# Patient Record
Sex: FEMALE | Race: OTHER | HISPANIC OR LATINO
[De-identification: names, ages, dates, MRNs, and addresses within clinical notes are randomized per-mention and may not be internally consistent; named-entity substitution may affect disease eponyms.]

---

## 2022-03-29 PROBLEM — Z00.00 ENCOUNTER FOR PREVENTIVE HEALTH EXAMINATION: Status: ACTIVE | Noted: 2022-03-29

## 2022-03-31 ENCOUNTER — APPOINTMENT (OUTPATIENT)
Dept: SURGERY | Facility: CLINIC | Age: 32
End: 2022-03-31
Payer: COMMERCIAL

## 2022-03-31 VITALS
DIASTOLIC BLOOD PRESSURE: 71 MMHG | WEIGHT: 187 LBS | HEIGHT: 62 IN | SYSTOLIC BLOOD PRESSURE: 113 MMHG | OXYGEN SATURATION: 99 % | HEART RATE: 93 BPM | BODY MASS INDEX: 34.41 KG/M2

## 2022-03-31 VITALS — TEMPERATURE: 97 F

## 2022-03-31 DIAGNOSIS — Z78.9 OTHER SPECIFIED HEALTH STATUS: ICD-10-CM

## 2022-03-31 DIAGNOSIS — E78.00 PURE HYPERCHOLESTEROLEMIA, UNSPECIFIED: ICD-10-CM

## 2022-03-31 DIAGNOSIS — Z56.0 UNEMPLOYMENT, UNSPECIFIED: ICD-10-CM

## 2022-03-31 PROCEDURE — 99203 OFFICE O/P NEW LOW 30 MIN: CPT

## 2022-03-31 SDOH — ECONOMIC STABILITY - INCOME SECURITY: UNEMPLOYMENT, UNSPECIFIED: Z56.0

## 2022-03-31 NOTE — PLAN
[FreeTextEntry1] : Ms. TAMIR OCASIO  was told significance of findings, options, risks and benefits were explained.  Informed consent for    ventral hernia repair and potential risks, benefits and alternatives (surgical options were discussed including non-surgical options or the option of no surgery) to the planned surgery were discussed in depth.  All surgical options were discussed including non-surgical treatments.  She wishes to proceed with surgery.  We will plan for surgery on at the next available date, pending any required insurance pre-certification or pre-approval. She agrees to obtain any necessary pre-operative evaluations and testing prior to surgery.\par Patient advised to seek immediate medical attention with any acute change in symptoms or with the development of any new or worsening symptoms.  Patient's questions and concerns addressed to patient's satisfaction, and patient verbalized an understanding of the information discussed.\par \par

## 2022-03-31 NOTE — PHYSICAL EXAM
[Alert] : alert [Oriented to Person] : oriented to person [Oriented to Place] : oriented to place [Oriented to Time] : oriented to time [Calm] : calm [Abdominal Masses] : No abdominal masses [Abdomen Tenderness] : ~T ~M No abdominal tenderness [de-identified] : She  is alert, well-groomed, and in NAD\par   [de-identified] : anicteric.  Nasal mucosa pink, septum midline. Oral mucosa pink.  Tongue midline, Pharynx without exudates.\par   [de-identified] : Neck supple. Trachea midline. Thyroid isthmus barely palpable, lobes not felt.\par   [de-identified] : \par reducible  ventral hernia, mildly tender.  The defect appears to be relatively small  and the skin overlying the hernia is normal

## 2022-03-31 NOTE — HISTORY OF PRESENT ILLNESS
[de-identified] : Ms. DELPHINE OCASIO is a 31 year  old female who was referred by Dr. Andrew HESTER with the chief complaint of having a hernia.  She reports having this condition for 3 months. She denies any trauma to the area, fever, nausea, vomiting, distension, night sweats and  loss of appetite.  Symptoms aggravated by cough and straining.  - She reports normal bowel movements    -She  reports  open  cholecystectomy  and umbilical repair last year in [facility].

## 2022-06-03 ENCOUNTER — OUTPATIENT (OUTPATIENT)
Dept: OUTPATIENT SERVICES | Facility: HOSPITAL | Age: 32
LOS: 1 days | End: 2022-06-03
Payer: COMMERCIAL

## 2022-06-03 VITALS
HEIGHT: 59.84 IN | HEART RATE: 65 BPM | RESPIRATION RATE: 16 BRPM | TEMPERATURE: 99 F | WEIGHT: 182.98 LBS | OXYGEN SATURATION: 100 % | DIASTOLIC BLOOD PRESSURE: 72 MMHG | SYSTOLIC BLOOD PRESSURE: 114 MMHG

## 2022-06-03 DIAGNOSIS — K43.2 INCISIONAL HERNIA WITHOUT OBSTRUCTION OR GANGRENE: ICD-10-CM

## 2022-06-03 DIAGNOSIS — Z98.891 HISTORY OF UTERINE SCAR FROM PREVIOUS SURGERY: Chronic | ICD-10-CM

## 2022-06-03 DIAGNOSIS — Z90.49 ACQUIRED ABSENCE OF OTHER SPECIFIED PARTS OF DIGESTIVE TRACT: Chronic | ICD-10-CM

## 2022-06-03 DIAGNOSIS — K21.9 GASTRO-ESOPHAGEAL REFLUX DISEASE WITHOUT ESOPHAGITIS: ICD-10-CM

## 2022-06-03 DIAGNOSIS — Z01.818 ENCOUNTER FOR OTHER PREPROCEDURAL EXAMINATION: ICD-10-CM

## 2022-06-03 DIAGNOSIS — E66.9 OBESITY, UNSPECIFIED: ICD-10-CM

## 2022-06-03 DIAGNOSIS — Z98.890 OTHER SPECIFIED POSTPROCEDURAL STATES: Chronic | ICD-10-CM

## 2022-06-03 DIAGNOSIS — Z78.9 OTHER SPECIFIED HEALTH STATUS: ICD-10-CM

## 2022-06-03 DIAGNOSIS — K43.9 VENTRAL HERNIA WITHOUT OBSTRUCTION OR GANGRENE: ICD-10-CM

## 2022-06-03 PROCEDURE — G0463: CPT

## 2022-06-03 NOTE — H&P PST ADULT - NSICDXPASTSURGICALHX_GEN_ALL_CORE_FT
PAST SURGICAL HISTORY:  H/O  section     H/O umbilical hernia repair     History of cholecystectomy

## 2022-06-03 NOTE — H&P PST ADULT - NSICDXFAMILYHX_GEN_ALL_CORE_FT
FAMILY HISTORY:  Mother  Still living? Yes, Estimated age: 52  Family history of diabetes mellitus (DM), Age at diagnosis: Age Unknown

## 2022-06-03 NOTE — H&P PST ADULT - PROBLEM SELECTOR PROBLEM 3
Phone Number Called: [phone] (home)       Call outcome: Spoke to patient regarding message below.    Message: Called to inform patient labs look good        
Ventral incisional hernia without obstruction or gangrene

## 2022-06-03 NOTE — H&P PST ADULT - HISTORY OF PRESENT ILLNESS
32 yr old  female obese with history of GERD, had ventral hernia for about 4 months ago when it started to hurt her. Pt was referred to surgeon. Pt schedule for ventral hernia repair on 6/8/2022. All history obtained via  #[id number] Susanne

## 2022-06-03 NOTE — H&P PST ADULT - PROBLEM SELECTOR PLAN 3
schedule for ventral hernia repair Pt given instructions with assistance of the . Pt aware of NPO the night before surgery and the morning of surgery. Pt also told how the use chlorhexidine 4% solution for the three days including the day of surgery. Pt given written instructions to follow schedule for ventral hernia repair Pt given instructions with assistance of the . Pt aware of NPO the night before surgery and the morning of surgery. Pt also told how the use chlorhexidine 4% solution for the three days including the day of surgery. Pt given written instructions to follow      Stop Bang Score -0 pt low risk for EMMA

## 2022-06-03 NOTE — H&P PST ADULT - NSICDXPASTMEDICALHX_GEN_ALL_CORE_FT
PAST MEDICAL HISTORY:  GERD (gastroesophageal reflux disease)     Obese     Ventral incisional hernia without obstruction or gangrene

## 2022-06-03 NOTE — H&P PST ADULT - ASSESSMENT
32 yr old  female obese with history of GERD, had ventral hernia for about 4 months ago when it started to hurt her. Pt was referred to surgeon. Pt schedule for ventral hernia repair on 6/8/2022. All history obtained via  #[id number] Susanne

## 2022-06-07 ENCOUNTER — TRANSCRIPTION ENCOUNTER (OUTPATIENT)
Age: 32
End: 2022-06-07

## 2022-06-08 ENCOUNTER — RESULT REVIEW (OUTPATIENT)
Age: 32
End: 2022-06-08

## 2022-06-08 ENCOUNTER — APPOINTMENT (OUTPATIENT)
Dept: SURGERY | Facility: HOSPITAL | Age: 32
End: 2022-06-08
Payer: COMMERCIAL

## 2022-06-08 ENCOUNTER — OUTPATIENT (OUTPATIENT)
Dept: OUTPATIENT SERVICES | Facility: HOSPITAL | Age: 32
LOS: 1 days | End: 2022-06-08
Payer: COMMERCIAL

## 2022-06-08 ENCOUNTER — TRANSCRIPTION ENCOUNTER (OUTPATIENT)
Age: 32
End: 2022-06-08

## 2022-06-08 VITALS
SYSTOLIC BLOOD PRESSURE: 116 MMHG | HEART RATE: 79 BPM | TEMPERATURE: 98 F | RESPIRATION RATE: 18 BRPM | OXYGEN SATURATION: 97 % | DIASTOLIC BLOOD PRESSURE: 72 MMHG

## 2022-06-08 VITALS
SYSTOLIC BLOOD PRESSURE: 107 MMHG | HEIGHT: 59.84 IN | RESPIRATION RATE: 17 BRPM | OXYGEN SATURATION: 98 % | WEIGHT: 182.98 LBS | DIASTOLIC BLOOD PRESSURE: 52 MMHG | TEMPERATURE: 99 F | HEART RATE: 74 BPM

## 2022-06-08 DIAGNOSIS — Z98.890 OTHER SPECIFIED POSTPROCEDURAL STATES: Chronic | ICD-10-CM

## 2022-06-08 DIAGNOSIS — Z90.49 ACQUIRED ABSENCE OF OTHER SPECIFIED PARTS OF DIGESTIVE TRACT: Chronic | ICD-10-CM

## 2022-06-08 DIAGNOSIS — K43.9 VENTRAL HERNIA WITHOUT OBSTRUCTION OR GANGRENE: ICD-10-CM

## 2022-06-08 DIAGNOSIS — Z98.891 HISTORY OF UTERINE SCAR FROM PREVIOUS SURGERY: Chronic | ICD-10-CM

## 2022-06-08 LAB — HCG SERPL-ACNC: <1 MIU/ML — SIGNIFICANT CHANGE UP

## 2022-06-08 PROCEDURE — 49568: CPT

## 2022-06-08 PROCEDURE — 36415 COLL VENOUS BLD VENIPUNCTURE: CPT

## 2022-06-08 PROCEDURE — 49560: CPT | Mod: AS

## 2022-06-08 PROCEDURE — C1781: CPT

## 2022-06-08 PROCEDURE — 88302 TISSUE EXAM BY PATHOLOGIST: CPT | Mod: 26

## 2022-06-08 PROCEDURE — 49560: CPT

## 2022-06-08 PROCEDURE — 88302 TISSUE EXAM BY PATHOLOGIST: CPT

## 2022-06-08 PROCEDURE — 84702 CHORIONIC GONADOTROPIN TEST: CPT

## 2022-06-08 DEVICE — MESH HERNIA VENTRALIGHT ST CIRCLE 4.5IN: Type: IMPLANTABLE DEVICE | Status: FUNCTIONAL

## 2022-06-08 DEVICE — MESH HERNIA PROLENE 3X6IN: Type: IMPLANTABLE DEVICE | Status: FUNCTIONAL

## 2022-06-08 RX ORDER — SODIUM CHLORIDE 9 MG/ML
3 INJECTION INTRAMUSCULAR; INTRAVENOUS; SUBCUTANEOUS EVERY 8 HOURS
Refills: 0 | Status: DISCONTINUED | OUTPATIENT
Start: 2022-06-08 | End: 2022-06-08

## 2022-06-08 RX ORDER — OXYCODONE HYDROCHLORIDE 5 MG/1
1 TABLET ORAL
Qty: 5 | Refills: 0
Start: 2022-06-08

## 2022-06-08 RX ORDER — HYDROMORPHONE HYDROCHLORIDE 2 MG/ML
0.5 INJECTION INTRAMUSCULAR; INTRAVENOUS; SUBCUTANEOUS
Refills: 0 | Status: DISCONTINUED | OUTPATIENT
Start: 2022-06-08 | End: 2022-06-08

## 2022-06-08 RX ORDER — FENTANYL CITRATE 50 UG/ML
25 INJECTION INTRAVENOUS
Refills: 0 | Status: DISCONTINUED | OUTPATIENT
Start: 2022-06-08 | End: 2022-06-08

## 2022-06-08 RX ORDER — FERROUS SULFATE 325(65) MG
1 TABLET ORAL
Qty: 0 | Refills: 0 | DISCHARGE

## 2022-06-08 RX ORDER — OXYCODONE AND ACETAMINOPHEN 5; 325 MG/1; MG/1
1 TABLET ORAL ONCE
Refills: 0 | Status: DISCONTINUED | OUTPATIENT
Start: 2022-06-08 | End: 2022-06-08

## 2022-06-08 RX ORDER — ACETAMINOPHEN 500 MG
1000 TABLET ORAL ONCE
Refills: 0 | Status: DISCONTINUED | OUTPATIENT
Start: 2022-06-08 | End: 2022-06-08

## 2022-06-08 RX ORDER — ONDANSETRON 8 MG/1
4 TABLET, FILM COATED ORAL ONCE
Refills: 0 | Status: DISCONTINUED | OUTPATIENT
Start: 2022-06-08 | End: 2022-06-08

## 2022-06-08 RX ORDER — OMEPRAZOLE 10 MG/1
1 CAPSULE, DELAYED RELEASE ORAL
Qty: 0 | Refills: 0 | DISCHARGE

## 2022-06-08 RX ADMIN — HYDROMORPHONE HYDROCHLORIDE 0.5 MILLIGRAM(S): 2 INJECTION INTRAMUSCULAR; INTRAVENOUS; SUBCUTANEOUS at 10:50

## 2022-06-08 RX ADMIN — HYDROMORPHONE HYDROCHLORIDE 0.5 MILLIGRAM(S): 2 INJECTION INTRAMUSCULAR; INTRAVENOUS; SUBCUTANEOUS at 11:05

## 2022-06-08 NOTE — ASU DISCHARGE PLAN (ADULT/PEDIATRIC) - NS MD DC FALL RISK RISK
For information on Fall & Injury Prevention, visit: [email or web address] OR  [email or web address] OR  https://www.cdc.gov/steadi/patient.html

## 2022-06-08 NOTE — BRIEF OPERATIVE NOTE - NSICDXBRIEFPROCEDURE_GEN_ALL_CORE_FT
PROCEDURES:  Ventral hernia repair 08-Jun-2022 10:25:12 With Mesh Adelaide Crowder  Incisional hernia repair 08-Jun-2022 10:29:40  Adelaide Crowder

## 2022-06-08 NOTE — ASU DISCHARGE PLAN (ADULT/PEDIATRIC) - DRESSING DRY FT
INFORMATION YOUR PROVIDER WANTS YOU TO KNOW    Thank you for choosing Dr. Xavier Ledezma at [facility]. It was a pleasure to care for you today!    Clinic Policy:  Cancellation and No Show: If you must cancel a visit, please give minimally 24 hours notice so we can accommodate other patients that have been waiting to be seen. Do not rely on a reminder call for your appointment as it is a courtesy and not guaranteed. Please write it on your calendar. You are responsible to be at all scheduled appointments. Any appointment cancelled less than 24 hours prior to start time will be considered a “No Show/Late Cancellation”.      You can cancel your appointment by calling our office at [phone] during our normal business hours which are as follows:  Monday-Thursday 8:00 am to 4:30 pm  Friday 8:00 am to 2:00 pm     Messages:  Messages left for Dr. Xavier Ledezma will be returned within 24-48 business hours.     Medication Requests:  We need up to 7 business days notice for refills to be completed. Please contact your preferred pharmacy for all non-narcotic refills. The pharmacy will contact the office for those refills. It is imperative that you plan ahead as we are unable to guarantee your refill by a certain date if this is not followed. If you miss appointments, you may not get a refill. When calling for refills or other concerns, please take into consideration that we are closed for holidays.    No medication changes are made over the phone, if you feel that a medication change is needed, please make an appointment.    Test results:  Any tests ordered by Dr. Ledezma will be reviewed at your next appointment.    Forms (FMLA/ Disability):  FMLA/disability forms will need an appointment to be completed. Please complete as much as possible on any forms prior to bringing them to your appointment. This includes adding a telephone number where you can be reached during normal  business hours. Please note that if you are requesting disability paperwork, you will have to complete a Functional Capacity Evaluation. This involves an evaluation that lasts approximately 4 hours and you will be responsible to call your insurance company to verify it is a covered expense.    To improve your clinical care, we practice treatments that often include Urine Drug Testing, a tool that helps to best monitor your care.    Reports are always confidential.    Urine Drug Testing (UDT) can be a valuable tool in clinical practices that include prescription of opioid analgesics for treatment of chronic pain. The overriding principle is that this testing is used to help the patient, and to enhance positive outcomes. The issue of testing is complex and the purpose of this guidance document is to assist the practitioner in formulating the rationale for testing, as well as to provide a framework for using test results in a positive way for the patient.    For billing questions, please contact:    [phone]     8 a.m-8 p.m Eastern Time     Monday-Friday    For more information, visit [email or web address]    We know how important your pain management is and together we can help you live your best life.    Your opinion matters!  Our desire is to increase your overall state of wellness and improve your quality of life with individualized patient care and innovative interventional modalities.    In the next few weeks, you may receive a [employer] survey regarding your clinic visit with us today. Your responses on this survey help us to maintain and improve the care we provide to you! We look forward to hearing from you!         Lumbar Epidural Injection: Recovery at Home    After a lumbar epidural injection, you don’t need to stay in bed when you get home. In fact, it’s best to walk around if you feel up to it. Just be careful about being too active. Even if you feel better right away, avoid activities that may  strain your back. And follow up on all treatment with your doctor.  What to know about pain relief  Keep in mind that some people feel more pain at first. It usually goes away within a few days. You may also have headaches or trouble sleeping, but if these symptoms are severe, you should call your doctor right away. These should also go away within a few days. In general:  · An injection to reduce inflammation takes a few days to work, sometimes even up to a week. There may even be more pain at first.  · An injection to help locate the source of pain may give only brief pain relief. Later, you’ll feel the same as you did before the injection.  Tips for recovery  Whether you were injected for pain relief or diagnosis, these tips will help you recover:  · Take walks when you feel up to it.  · Rest if needed, but get up and move around after sitting for half an hour.  · Don’t exercise vigorously.  · Don’t drive the day of the procedure or until your doctor says it’s OK.  · Return to work or other activities when your doctor says you’re ready.  When to call your doctor  Call right away if you notice any of the following symptoms:  · Severe pain or headache  · Loss of bladder or bowel control  · Fever or chills  · Redness or swelling around the injection site       © 0649-3689 [employer]. [address] [place] [address], [place] [address]. All rights reserved. This information is not intended as a substitute for professional medical care. Always follow your healthcare professional's instructions.         2

## 2022-06-08 NOTE — ASU DISCHARGE PLAN (ADULT/PEDIATRIC) - ASU DC SPECIAL INSTRUCTIONSFT
Please follow-up with your surgeon in 1 week. Drink plenty of fluids and rest as needed. Call for any fever over 101, nausea, vomiting, severe pain, no passing of gas or bowel movement.     DIET   You may resume your regular diet as normal.     SURGICAL SITES   Remove outer dressing and keep white steri-strips in place allowing them to fall off on their own. You may shower 48 hours post-operatively but do not bathe or soak in the water for 1-2 weeks; pat dry. If you notice any signs of surgical site infection (ie. redness, swelling, pain, pus drainage), please seek medical care immediately.    ACTIVITY Do not lift anything heavier than 10 pounds for 2-3 months, no exercise for 2 weeks) and avoid strenuous activity for 4-6 weeks.     PAIN CONTROL   You may take Motrin 600mg (with food) or Tylenol 650mg as needed for mild pain. Stagger one medication 3 hours after the other for maximum pain control. Maximum daily dose of Tylenol should not exceed 4grams/day.  You may take your prescribed oxycodone for severe breakthrough pain not that is not relieved by Tylenol/Motrin. Do not drive or make important decisions while taking this medication and do not take more than 4 pills in 24 hours.  Please refer to medical records/ progress notes for in depth hospital course. Discharge plan discussed and agreed with attending.

## 2022-06-08 NOTE — ASU DISCHARGE PLAN (ADULT/PEDIATRIC) - CARE PROVIDER_API CALL
Bill Ellington)  Surgery  [address], [place] [address]  Phone: [phone]  Fax: [phone]  Follow Up Time: 2 weeks

## 2022-06-08 NOTE — BRIEF OPERATIVE NOTE - NSICDXBRIEFPOSTOP_GEN_ALL_CORE_FT
POST-OP DIAGNOSIS:  Ventral hernia 08-Jun-2022 10:30:14  Adelaide Crowder  Incisional hernia 08-Jun-2022 10:30:24  Adelaide Crowder

## 2022-06-08 NOTE — ASU DISCHARGE PLAN (ADULT/PEDIATRIC) - NS DC  LANGUAGE ASSIST NO REASON
EMERGENCY DEPARTMENT HISTORY AND PHYSICAL EXAM    8:15 PM      Date: 6/29/2020  Patient Name: Yuli Butler    Ysabel Love  mL/hr at 06/29/20 1955 500 mL at 06/29/20 1955    magnesium sulfate 2 g/50 ml IVPB (premix or compounded)  2 g IntraVENous ONCE Ysabel Love MD 50 mL/hr at 06/29/20 1955 2 g at 06/29/20 1955       Patient/caregiver requests family/friend to interpret.

## 2022-06-09 PROBLEM — K21.9 GASTRO-ESOPHAGEAL REFLUX DISEASE WITHOUT ESOPHAGITIS: Chronic | Status: ACTIVE | Noted: 2022-06-03

## 2022-06-09 PROBLEM — E66.9 OBESITY, UNSPECIFIED: Chronic | Status: ACTIVE | Noted: 2022-06-03

## 2022-06-09 PROBLEM — K43.2 INCISIONAL HERNIA WITHOUT OBSTRUCTION OR GANGRENE: Chronic | Status: ACTIVE | Noted: 2022-06-03

## 2022-06-10 LAB — SURGICAL PATHOLOGY STUDY: SIGNIFICANT CHANGE UP

## 2022-06-20 ENCOUNTER — APPOINTMENT (OUTPATIENT)
Dept: SURGERY | Facility: CLINIC | Age: 32
End: 2022-06-20
Payer: COMMERCIAL

## 2022-06-20 PROCEDURE — 99024 POSTOP FOLLOW-UP VISIT: CPT

## 2022-06-20 NOTE — DATA REVIEWED
[FreeTextEntry1] : Martha Accession Number : [id number]\par Patient:   DELPHINE PAULINO\par \par \par Accession:                             [id number]\par \par Collected Date/Time:                   6/8/2022 09:56 EDT\par Received Date/Time:                    6/9/2022 10:18 EDT\par \par Surgical Pathology Report - Auth (Verified)\par \par Specimen(s) Submitted\par 1  Hernia sac\par \par Final Diagnosis\par \par Hernia sac; repair of incisional hernia:\par - Hernial sac.\par \par Verified by: Kavitha Tinsley MD\par (Electronic Signature)\par Reported on: 06/10/22 19:07 EDT, [address]\par Phone: [phone]   Fax: [phone]\par _________________________________________________________________\par \par Clinical Information\par Incisional hernia\par \par Repair of incisional hernia

## 2022-06-20 NOTE — HISTORY OF PRESENT ILLNESS
[de-identified] : Ms. PAULINO  is s/p  Repair of incisional hernia on 06/08/2022.  Today  Ms. PAULINO offers no complaints. patient reports no fever or  chills. patient reports occasional discomfort in the surgical area.  Her surgical incisions are healing well. No signs of inflammation, infection or exudate. patient reports good bowel movements and appetite.

## 2022-06-20 NOTE — PHYSICAL EXAM
[Calm] : calm [de-identified] : She  is alert, well-groomed, and in NAD\par   [de-identified] : Surgical wound is healing well.   no signs of  inflammation or infection. no recurrence

## 2022-06-20 NOTE — ASSESSMENT
[FreeTextEntry1] : Ms. PAULINO is doing well, with excellent post-operative recovery. All surgical incisions are healing well and as expected. There is no evidence of infection or complication, and she is progressing as expected. Post-operative wound care, activity, restrictions and precautions reinforced. Patient instructed to refrain from any heavy lifting greater than 10-15 pounds for at least 4-6 weeks post-operatively. Patient's questions and concerns addressed to patient's satisfaction.\par

## 2022-06-20 NOTE — PLAN
[FreeTextEntry1] : patient will follow up in 3 months or if needed. Warning signs, follow up, and restrictions were discussed with the patient. \par PAtient was advised to loose weight. I reviewed importance of behavioral modification. Nutrition was  reviewed and reinforced, including the importance  of making healthy food choices. The importance of maintaining regular exercise/physical activity also reinforced. Recommend patient to see nutritionist. Patient's questions and concerns addressed to patient's satisfaction.

## 2022-09-22 ENCOUNTER — APPOINTMENT (OUTPATIENT)
Dept: SURGERY | Facility: CLINIC | Age: 32
End: 2022-09-22

## 2022-10-03 ENCOUNTER — APPOINTMENT (OUTPATIENT)
Dept: SURGERY | Facility: CLINIC | Age: 32
End: 2022-10-03

## 2022-10-03 VITALS
DIASTOLIC BLOOD PRESSURE: 78 MMHG | HEART RATE: 86 BPM | BODY MASS INDEX: 32.2 KG/M2 | SYSTOLIC BLOOD PRESSURE: 117 MMHG | WEIGHT: 175 LBS | HEIGHT: 62 IN

## 2022-10-03 VITALS — TEMPERATURE: 96.2 F

## 2022-10-03 DIAGNOSIS — K43.9 VENTRAL HERNIA W/OUT OBSTRUCTION OR GANGRENE: ICD-10-CM

## 2022-10-03 PROCEDURE — 99213 OFFICE O/P EST LOW 20 MIN: CPT

## 2022-10-03 NOTE — PHYSICAL EXAM
[Alert] : alert [Oriented to Person] : oriented to person [Oriented to Place] : oriented to place [Oriented to Time] : oriented to time [Calm] : calm [de-identified] : She  is alert, well-groomed, and in NAD\par   [de-identified] : anicteric.  Nasal mucosa pink, septum midline. Oral mucosa pink.  Tongue midline, Pharynx without exudates.\par   [de-identified] : Neck supple. Trachea midline. Thyroid isthmus barely palpable, lobes not felt.\par   [de-identified] :  no hernia recurrence

## 2022-10-03 NOTE — ASSESSMENT
[FreeTextEntry1] : Ms. TAMIR OCASIO is doing well, with excellent post-operative recovery. All surgical incisions  healed well and as expected. There is no evidence of  complication or recurrence, and she is progressing as expected. Patient can return to normal activity without restrictions.  Patient's questions and concerns addressed to patient's satisfaction.\par

## 2022-10-03 NOTE — PLAN
[FreeTextEntry1] : Ms. TAMIR OCASIO will follow up  if needed. Warning signs, follow up, and restrictions were discussed with the patient.

## 2022-10-03 NOTE — HISTORY OF PRESENT ILLNESS
[de-identified] : Ms. PAULINO  is s/p  Repair of incisional hernia on 06/08/2022.  Today  Ms. TAMIR OCASIO offers no complaints. patient reports no fever or  chills. patient reports occasional discomfort in the surgical area.    patient reports good bowel movements and appetite.

## 2023-08-23 LAB — SARS-COV-2 N GENE NPH QL NAA+PROBE: NOT DETECTED

## 2024-09-12 ENCOUNTER — APPOINTMENT (OUTPATIENT)
Dept: SURGERY | Facility: CLINIC | Age: 34
End: 2024-09-12
Payer: MEDICAID

## 2024-09-12 VITALS
HEART RATE: 81 BPM | DIASTOLIC BLOOD PRESSURE: 66 MMHG | OXYGEN SATURATION: 99 % | WEIGHT: 188 LBS | HEIGHT: 62 IN | BODY MASS INDEX: 34.6 KG/M2 | SYSTOLIC BLOOD PRESSURE: 125 MMHG

## 2024-09-12 DIAGNOSIS — K43.2 INCISIONAL HERNIA W/OUT OBSTRUCTION OR GANGRENE: ICD-10-CM

## 2024-09-12 PROCEDURE — 99213 OFFICE O/P EST LOW 20 MIN: CPT

## 2024-09-12 NOTE — PHYSICAL EXAM
[Alert] : alert [Oriented to Person] : oriented to person [Oriented to Place] : oriented to place [Oriented to Time] : oriented to time [Calm] : calm [de-identified] : She  is alert, well-groomed, and in NAD\par    [de-identified] : anicteric.  Nasal mucosa pink, septum midline. Oral mucosa pink.  Tongue midline, Pharynx without exudates.\par    [de-identified] : Neck supple. Trachea midline. Thyroid isthmus barely palpable, lobes not felt.\par    [de-identified] :    reducible recurrent incisional  hernia, mildly tender.  The defect appears to be relatively small, less than 3 cm and the skin overlying the hernia is normal

## 2024-09-12 NOTE — PHYSICAL EXAM
[Alert] : alert [Oriented to Person] : oriented to person [Oriented to Place] : oriented to place [Oriented to Time] : oriented to time [Calm] : calm [de-identified] : anicteric.  Nasal mucosa pink, septum midline. Oral mucosa pink.  Tongue midline, Pharynx without exudates.\par    [de-identified] : She  is alert, well-groomed, and in NAD\par    [de-identified] : Neck supple. Trachea midline. Thyroid isthmus barely palpable, lobes not felt.\par    [de-identified] :    reducible recurrent incisional  hernia, mildly tender.  The defect appears to be relatively small, less than 3 cm and the skin overlying the hernia is normal

## 2024-09-12 NOTE — HISTORY OF PRESENT ILLNESS
[de-identified] : Ms. DELPHINE OCASIO is a 34 year  old female  is presenting  with the chief complaint of having a bulge right to her umbilicus .  She reports having this condition for 3 months. She denies any trauma to the area, fever, nausea, vomiting, distension, night sweats and  loss of appetite.  Symptoms aggravated by cough and straining.  - She reports normal bowel movements.    she is 10 months post-partum.  she reports weight gain    Ms. PAULINO  is s/p  Repair of incisional hernia on 06/08/2022.

## 2024-09-12 NOTE — PLAN
[FreeTextEntry1] : Ms. TAMIR OCASIO  was told significance of findings, options, risks and benefits were explained.  Informed consent for laparoscopic/possible open  reducible recurrent incisional  hernia repair , and potential risks, benefits and alternatives (surgical options were discussed including non-surgical options or the option of no surgery) to the planned surgery were discussed in depth.  All surgical options were discussed including non-surgical treatments.  She wishes to proceed with surgery.  she is 10 months post-partum and has childcare issues.  We will plan for surgery on at the next available date when she decides , pending any required insurance pre-certification or pre-approval. She agrees to obtain any necessary pre-operative evaluations and testing prior to surgery. Patient advised to seek immediate medical attention with any acute change in symptoms or with the development of any new or worsening symptoms.  Patient's questions and concerns addressed to patient's satisfaction, and patient verbalized an understanding of the information discussed.

## 2024-09-12 NOTE — HISTORY OF PRESENT ILLNESS
[de-identified] : Ms. DELPHINE OCASIO is a 34 year  old female  is presenting  with the chief complaint of having a bulge right to her umbilicus .  She reports having this condition for 3 months. She denies any trauma to the area, fever, nausea, vomiting, distension, night sweats and  loss of appetite.  Symptoms aggravated by cough and straining.  - She reports normal bowel movements.    she is 10 months post-partum.  she reports weight gain    Ms. PAULINO  is s/p  Repair of incisional hernia on 06/08/2022.

## (undated) DEVICE — DRAPE LAPAROTOMY W POUCHES

## (undated) DEVICE — GLV 7.5 PROTEXIS

## (undated) DEVICE — SUT SURGIPRO II 1 30" GS-25

## (undated) DEVICE — PREP CHLORAPREP ORANGE 2PCT 26ML

## (undated) DEVICE — BLANKET WARMER UPPER ADULT

## (undated) DEVICE — GLV 7 PROTEXIS

## (undated) DEVICE — SUT POLYSORB 3-0 36" GS-21

## (undated) DEVICE — DRSG MASTISOL

## (undated) DEVICE — SUT POLYSORB 0 36" GS-25

## (undated) DEVICE — SUT POLYSORB 4-0 27" P-12 UNDYED

## (undated) DEVICE — SOL IRR POUR NS 0.9% 1500ML

## (undated) DEVICE — WRAP COMPRESSION CALF MED

## (undated) DEVICE — ELCTR GROUNDING PAD ADULT COVIDIEN

## (undated) DEVICE — DRAPE LIGHT HANDLE COVER BLUE

## (undated) DEVICE — DRAPE HALF SHEET 40X57"

## (undated) DEVICE — FOR-ESU VALLEYLAB T6D04509DX: Type: DURABLE MEDICAL EQUIPMENT

## (undated) DEVICE — PACK MINOR NO DRAPE

## (undated) DEVICE — NDL HYPO SAFE 25G X 1.5"